# Patient Record
Sex: MALE | Race: WHITE | NOT HISPANIC OR LATINO
[De-identification: names, ages, dates, MRNs, and addresses within clinical notes are randomized per-mention and may not be internally consistent; named-entity substitution may affect disease eponyms.]

---

## 2020-11-08 ENCOUNTER — TRANSCRIPTION ENCOUNTER (OUTPATIENT)
Age: 80
End: 2020-11-08

## 2022-04-07 ENCOUNTER — APPOINTMENT (OUTPATIENT)
Dept: PAIN MANAGEMENT | Facility: CLINIC | Age: 82
End: 2022-04-07
Payer: MEDICARE

## 2022-04-07 VITALS — HEIGHT: 66 IN | WEIGHT: 198 LBS | BODY MASS INDEX: 31.82 KG/M2

## 2022-04-07 DIAGNOSIS — R29.898 OTHER SYMPTOMS AND SIGNS INVOLVING THE MUSCULOSKELETAL SYSTEM: ICD-10-CM

## 2022-04-07 DIAGNOSIS — M54.50 LOW BACK PAIN, UNSPECIFIED: ICD-10-CM

## 2022-04-07 PROCEDURE — 99214 OFFICE O/P EST MOD 30 MIN: CPT

## 2022-04-07 NOTE — ASSESSMENT
[FreeTextEntry1] : diffuse left LE weakness. unsure of etiology. I would recommend he get clearance to stop a/c to get EMG for further evaluation. \par \par cardiologist: Dr. Chapin: [phone]\par \par fax: [phone]

## 2022-04-07 NOTE — ASSESSMENT
[FreeTextEntry1] : diffuse left LE weakness. unsure of etiology. I would recommend he get clearance to stop a/c to get EMG for further evaluation. \par \par cardiologist: Dr. Chapin: [phone]\par \par fax: [phone]

## 2022-04-07 NOTE — HISTORY OF PRESENT ILLNESS
[Lower back] : lower back [0] : 0 [] : yes [Standing] : standing [Walking] : walking [FreeTextEntry1] : 4/7/22: Patient presents for initial evaluation. He has seen Dr Kelly in the past and had YARON's. He has no pain in the lower back. Has weakness in the left leg. n/t. Just weakness. Intermittent pain in the lower back. \par \par Subjective Weakness: Yes\par Numbness/Tingling:No\par Bladder/Bowel dysfunction: No\par Physical Therapy:Last year\par \par \par Attempted modalities for current pain complaint:\par See above:\par Medications: No\par \par Injections: Yes LESI L5/S1 (Dr. kelly) \par \par Previous Spine Surgery: N/A\par \par Imaging:\par MRI Lumbar Spine (4/2/22) Mild multilevel lumbar degenerative disc disease.\par Small disc herniations from L2-3 through L5-S1 without stenosis or nerve root compression.\par Bilateral renal cyst.\par Lumbar spine x-ray dated March 25, 2022 demonstrating a 1.6 cm calculus in the lower pole of the right kidney\par \par EMG/NCV: (3/31/22): no large fiber neuropathy. can not r/o radiculopathy\par \par MRI Left hip (3/21/22): 1. 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 Enlargement of the prostate gland with heterogeneous appearance of the prostate gland and mass effect upon the urinary bladder base as well as dilatation of the rectosigmoid colon without free fluid in the pelvis but no evidence of surrounding adenopathy. CT scan of the abdomen and pelvis with intravenous and oral contrast material should be considered to further evaluate.\par 3. No acute osseous injury involving the visualized bony pelvis.\par 4. Left greater than right inguinal hernias containing fat measuring approximately 4-5 cm on the left and 2-3 cm on the right.\par \par  [FreeTextEntry6] : Numbness [FreeTextEntry7] : left leg

## 2022-04-07 NOTE — PHYSICAL EXAM
[0___] : left hip flexion 0[unfilled]/5 [2___] : left quadriceps 2[unfilled]/5 [1___] : left dorsiflexors 1[unfilled]/5 [4___] : right hip flexion 4[unfilled]/5 [5___] : right extensor hallicus longus 5[unfilled]/5 [] : no lumbar paraspinal tenderness

## 2022-05-12 ENCOUNTER — APPOINTMENT (OUTPATIENT)
Dept: NEUROLOGY | Facility: CLINIC | Age: 82
End: 2022-05-12

## 2025-01-13 ENCOUNTER — NON-APPOINTMENT (OUTPATIENT)
Age: 85
End: 2025-01-13